# Patient Record
Sex: MALE | Race: WHITE | Employment: OTHER | URBAN - METROPOLITAN AREA
[De-identification: names, ages, dates, MRNs, and addresses within clinical notes are randomized per-mention and may not be internally consistent; named-entity substitution may affect disease eponyms.]

---

## 2017-01-19 ENCOUNTER — APPOINTMENT (OUTPATIENT)
Dept: GENERAL RADIOLOGY | Age: 82
End: 2017-01-19
Payer: MEDICARE

## 2017-01-19 ENCOUNTER — HOSPITAL ENCOUNTER (EMERGENCY)
Age: 82
Discharge: HOME | End: 2017-01-19
Payer: MEDICARE

## 2017-01-19 VITALS
HEART RATE: 117 BPM | BODY MASS INDEX: 22.46 KG/M2 | WEIGHT: 175 LBS | DIASTOLIC BLOOD PRESSURE: 84 MMHG | SYSTOLIC BLOOD PRESSURE: 131 MMHG | TEMPERATURE: 97.1 F | HEIGHT: 74 IN | RESPIRATION RATE: 20 BRPM | OXYGEN SATURATION: 97 %

## 2017-01-19 DIAGNOSIS — S80.01XA CONTUSION OF RIGHT KNEE, INITIAL ENCOUNTER: Primary | ICD-10-CM

## 2017-01-19 PROCEDURE — 99283 EMERGENCY DEPT VISIT LOW MDM: CPT

## 2017-01-19 PROCEDURE — 73562 X-RAY EXAM OF KNEE 3: CPT

## 2017-01-19 PROCEDURE — 6370000000 HC RX 637 (ALT 250 FOR IP): Performed by: PHYSICIAN ASSISTANT

## 2017-01-19 RX ORDER — HYDROCODONE BITARTRATE AND ACETAMINOPHEN 5; 325 MG/1; MG/1
1 TABLET ORAL ONCE
Status: COMPLETED | OUTPATIENT
Start: 2017-01-19 | End: 2017-01-19

## 2017-01-19 RX ORDER — HYDROCODONE BITARTRATE AND ACETAMINOPHEN 5; 325 MG/1; MG/1
1 TABLET ORAL EVERY 6 HOURS PRN
Qty: 10 TABLET | Refills: 0 | Status: SHIPPED | OUTPATIENT
Start: 2017-01-19 | End: 2017-01-26

## 2017-01-19 RX ORDER — AMOXICILLIN 400 MG/5ML
90 POWDER, FOR SUSPENSION ORAL 2 TIMES DAILY
Qty: 447 ML | Refills: 0 | Status: SHIPPED | OUTPATIENT
Start: 2017-01-19 | End: 2017-01-19 | Stop reason: CLARIF

## 2017-01-19 RX ADMIN — HYDROCODONE BITARTRATE AND ACETAMINOPHEN 1 TABLET: 5; 325 TABLET ORAL at 09:30

## 2017-01-19 ASSESSMENT — ENCOUNTER SYMPTOMS
EYE DISCHARGE: 0
ABDOMINAL PAIN: 0
CHEST TIGHTNESS: 0
ANAL BLEEDING: 0
CONSTIPATION: 0
ABDOMINAL DISTENTION: 0
CHOKING: 0
NAUSEA: 0
COLOR CHANGE: 0
COUGH: 0
STRIDOR: 0
RHINORRHEA: 0
SORE THROAT: 0
WHEEZING: 0
SHORTNESS OF BREATH: 0
VOMITING: 0

## 2017-01-19 ASSESSMENT — PAIN SCALES - GENERAL
PAINLEVEL_OUTOF10: 10
PAINLEVEL_OUTOF10: 10

## 2017-01-19 ASSESSMENT — PAIN DESCRIPTION - ORIENTATION: ORIENTATION: RIGHT

## 2017-01-19 ASSESSMENT — PAIN DESCRIPTION - LOCATION: LOCATION: KNEE

## 2021-03-21 ENCOUNTER — HOSPITAL ENCOUNTER (INPATIENT)
Age: 86
LOS: 1 days | Discharge: HOME | DRG: 565 | End: 2021-03-22
Attending: SURGERY | Admitting: SURGERY
Payer: MEDICARE

## 2021-03-21 ENCOUNTER — APPOINTMENT (OUTPATIENT)
Dept: CT IMAGING | Age: 86
DRG: 565 | End: 2021-03-21
Payer: MEDICARE

## 2021-03-21 DIAGNOSIS — I45.2 BIFASCICULAR BLOCK: ICD-10-CM

## 2021-03-21 DIAGNOSIS — S22.22XA CLOSED FRACTURE OF BODY OF STERNUM, INITIAL ENCOUNTER: Primary | ICD-10-CM

## 2021-03-21 LAB
ALBUMIN SERPL-MCNC: 4 G/DL (ref 3.5–4.6)
ALP BLD-CCNC: 98 U/L (ref 35–104)
ALT SERPL-CCNC: 29 U/L (ref 0–41)
ANION GAP SERPL CALCULATED.3IONS-SCNC: 10 MEQ/L (ref 9–15)
APTT: 27.7 SEC (ref 24.4–36.8)
AST SERPL-CCNC: 25 U/L (ref 0–40)
BASOPHILS ABSOLUTE: 0 K/UL (ref 0–0.2)
BASOPHILS RELATIVE PERCENT: 0.3 %
BILIRUB SERPL-MCNC: 0.4 MG/DL (ref 0.2–0.7)
BUN BLDV-MCNC: 21 MG/DL (ref 8–23)
CALCIUM SERPL-MCNC: 9.6 MG/DL (ref 8.5–9.9)
CHLORIDE BLD-SCNC: 102 MEQ/L (ref 95–107)
CO2: 25 MEQ/L (ref 20–31)
CREAT SERPL-MCNC: 0.7 MG/DL (ref 0.7–1.2)
EKG ATRIAL RATE: 90 BPM
EKG P AXIS: 72 DEGREES
EKG P-R INTERVAL: 208 MS
EKG Q-T INTERVAL: 406 MS
EKG QRS DURATION: 116 MS
EKG QTC CALCULATION (BAZETT): 496 MS
EKG R AXIS: -74 DEGREES
EKG T AXIS: 87 DEGREES
EKG VENTRICULAR RATE: 90 BPM
EOSINOPHILS ABSOLUTE: 0.1 K/UL (ref 0–0.7)
EOSINOPHILS RELATIVE PERCENT: 1.3 %
ETHANOL PERCENT: NORMAL G/DL
ETHANOL: <10 MG/DL (ref 0–0.08)
GFR AFRICAN AMERICAN: >60
GFR NON-AFRICAN AMERICAN: >60
GLOBULIN: 3.3 G/DL (ref 2.3–3.5)
GLUCOSE BLD-MCNC: 104 MG/DL (ref 70–99)
HCT VFR BLD CALC: 38.8 % (ref 42–52)
HEMOGLOBIN: 13.3 G/DL (ref 14–18)
INR BLD: 1.1
LYMPHOCYTES ABSOLUTE: 1.7 K/UL (ref 1–4.8)
LYMPHOCYTES RELATIVE PERCENT: 23.3 %
MCH RBC QN AUTO: 32.1 PG (ref 27–31.3)
MCHC RBC AUTO-ENTMCNC: 34.2 % (ref 33–37)
MCV RBC AUTO: 94 FL (ref 80–100)
MONOCYTES ABSOLUTE: 0.7 K/UL (ref 0.2–0.8)
MONOCYTES RELATIVE PERCENT: 10.1 %
NEUTROPHILS ABSOLUTE: 4.6 K/UL (ref 1.4–6.5)
NEUTROPHILS RELATIVE PERCENT: 65 %
PDW BLD-RTO: 13.4 % (ref 11.5–14.5)
PLATELET # BLD: 248 K/UL (ref 130–400)
POC CREATININE WHOLE BLOOD: 0.8
POTASSIUM SERPL-SCNC: 3.7 MEQ/L (ref 3.4–4.9)
PROTHROMBIN TIME: 14 SEC (ref 12.3–14.9)
RBC # BLD: 4.13 M/UL (ref 4.7–6.1)
SARS-COV-2, NAAT: NOT DETECTED
SODIUM BLD-SCNC: 137 MEQ/L (ref 135–144)
TOTAL PROTEIN: 7.3 G/DL (ref 6.3–8)
TROPONIN: <0.01 NG/ML (ref 0–0.01)
WBC # BLD: 7.1 K/UL (ref 4.8–10.8)

## 2021-03-21 PROCEDURE — 1210000000 HC MED SURG R&B

## 2021-03-21 PROCEDURE — 36415 COLL VENOUS BLD VENIPUNCTURE: CPT

## 2021-03-21 PROCEDURE — 71260 CT THORAX DX C+: CPT

## 2021-03-21 PROCEDURE — 80053 COMPREHEN METABOLIC PANEL: CPT

## 2021-03-21 PROCEDURE — 85730 THROMBOPLASTIN TIME PARTIAL: CPT

## 2021-03-21 PROCEDURE — 99223 1ST HOSP IP/OBS HIGH 75: CPT | Performed by: SURGERY

## 2021-03-21 PROCEDURE — 84484 ASSAY OF TROPONIN QUANT: CPT

## 2021-03-21 PROCEDURE — 6370000000 HC RX 637 (ALT 250 FOR IP): Performed by: SURGERY

## 2021-03-21 PROCEDURE — 2580000003 HC RX 258: Performed by: SURGERY

## 2021-03-21 PROCEDURE — 85025 COMPLETE CBC W/AUTO DIFF WBC: CPT

## 2021-03-21 PROCEDURE — 85610 PROTHROMBIN TIME: CPT

## 2021-03-21 PROCEDURE — 93005 ELECTROCARDIOGRAM TRACING: CPT | Performed by: PHYSICIAN ASSISTANT

## 2021-03-21 PROCEDURE — 6360000002 HC RX W HCPCS: Performed by: SURGERY

## 2021-03-21 PROCEDURE — 6360000004 HC RX CONTRAST MEDICATION: Performed by: PHYSICIAN ASSISTANT

## 2021-03-21 PROCEDURE — 99285 EMERGENCY DEPT VISIT HI MDM: CPT

## 2021-03-21 PROCEDURE — 82077 ASSAY SPEC XCP UR&BREATH IA: CPT

## 2021-03-21 PROCEDURE — 87635 SARS-COV-2 COVID-19 AMP PRB: CPT

## 2021-03-21 RX ORDER — SODIUM CHLORIDE 0.9 % (FLUSH) 0.9 %
10 SYRINGE (ML) INJECTION PRN
Status: DISCONTINUED | OUTPATIENT
Start: 2021-03-21 | End: 2021-03-22 | Stop reason: HOSPADM

## 2021-03-21 RX ORDER — ONDANSETRON 2 MG/ML
4 INJECTION INTRAMUSCULAR; INTRAVENOUS EVERY 6 HOURS PRN
Status: DISCONTINUED | OUTPATIENT
Start: 2021-03-21 | End: 2021-03-22 | Stop reason: HOSPADM

## 2021-03-21 RX ORDER — KETOROLAC TROMETHAMINE 15 MG/ML
15 INJECTION, SOLUTION INTRAMUSCULAR; INTRAVENOUS EVERY 6 HOURS
Status: DISCONTINUED | OUTPATIENT
Start: 2021-03-21 | End: 2021-03-22 | Stop reason: HOSPADM

## 2021-03-21 RX ORDER — SODIUM CHLORIDE 0.9 % (FLUSH) 0.9 %
10 SYRINGE (ML) INJECTION EVERY 12 HOURS SCHEDULED
Status: DISCONTINUED | OUTPATIENT
Start: 2021-03-21 | End: 2021-03-22 | Stop reason: HOSPADM

## 2021-03-21 RX ORDER — LIDOCAINE 4 G/G
2 PATCH TOPICAL DAILY
Status: DISCONTINUED | OUTPATIENT
Start: 2021-03-21 | End: 2021-03-22 | Stop reason: HOSPADM

## 2021-03-21 RX ORDER — PROMETHAZINE HYDROCHLORIDE 12.5 MG/1
12.5 TABLET ORAL EVERY 6 HOURS PRN
Status: DISCONTINUED | OUTPATIENT
Start: 2021-03-21 | End: 2021-03-22 | Stop reason: HOSPADM

## 2021-03-21 RX ORDER — POLYETHYLENE GLYCOL 3350 17 G/17G
17 POWDER, FOR SOLUTION ORAL DAILY PRN
Status: DISCONTINUED | OUTPATIENT
Start: 2021-03-21 | End: 2021-03-22 | Stop reason: HOSPADM

## 2021-03-21 RX ORDER — ACETAMINOPHEN 325 MG/1
650 TABLET ORAL EVERY 6 HOURS
Status: DISCONTINUED | OUTPATIENT
Start: 2021-03-21 | End: 2021-03-22 | Stop reason: HOSPADM

## 2021-03-21 RX ADMIN — SODIUM CHLORIDE, PRESERVATIVE FREE 10 ML: 5 INJECTION INTRAVENOUS at 20:10

## 2021-03-21 RX ADMIN — IOPAMIDOL 100 ML: 612 INJECTION, SOLUTION INTRAVENOUS at 15:24

## 2021-03-21 RX ADMIN — ACETAMINOPHEN 650 MG: 325 TABLET ORAL at 20:10

## 2021-03-21 RX ADMIN — ENOXAPARIN SODIUM 30 MG: 30 INJECTION SUBCUTANEOUS at 20:10

## 2021-03-21 ASSESSMENT — ENCOUNTER SYMPTOMS
TROUBLE SWALLOWING: 0
ABDOMINAL PAIN: 0
COLOR CHANGE: 0
EYE PAIN: 0
ALLERGIC/IMMUNOLOGIC NEGATIVE: 1
APNEA: 0
COUGH: 1
SHORTNESS OF BREATH: 0

## 2021-03-21 NOTE — ED NOTES
Pt awake and alert. No s/s of distress noted at this time. No concerns or needs at this time. Will continue to monitor.         Santa Suarez RN  03/21/21 6365
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
## 2021-03-21 NOTE — H&P
See consult note written by myself on 3/21/21 for H&P.     Luz Moffett MD  Trauma, Surgical Critical Care, and Emergency General Surgery
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
## 2021-03-21 NOTE — ACP (ADVANCE CARE PLANNING)
Directives and portable DNR orders. Length of ACP Conversation in minutes:      Conversation Outcomes:  [x] ACP discussion completed  [] Existing advance directive reviewed with patient; no changes to patient's previously recorded wishes  [] New Advance Directive completed  [] Portable Do Not Rescitate prepared for Provider review and signature  [] POLST/POST/MOLST/MOST prepared for Provider review and signature      Follow-up plan:    [] Schedule follow-up conversation to continue planning  [x] Referred individual to Provider for additional questions/concerns   [] Advised patient/agent/surrogate to review completed ACP document and update if needed with changes in condition, patient preferences or care setting    [x] This note routed to one or more involved healthcare providers.

## 2021-03-21 NOTE — ED NOTES
Report to [facility] on 2WT. Per Dr. Keven Rolon pt can be sent to the floor before Covid is resulted.      Marni Mccollum RN  03/21/21 0044

## 2021-03-22 VITALS
TEMPERATURE: 97.2 F | DIASTOLIC BLOOD PRESSURE: 70 MMHG | HEART RATE: 93 BPM | WEIGHT: 150 LBS | BODY MASS INDEX: 19.25 KG/M2 | SYSTOLIC BLOOD PRESSURE: 132 MMHG | OXYGEN SATURATION: 100 % | HEIGHT: 74 IN | RESPIRATION RATE: 18 BRPM

## 2021-03-22 PROBLEM — G89.11 ACUTE PAIN DUE TO TRAUMA: Status: ACTIVE | Noted: 2021-03-21

## 2021-03-22 LAB
ANION GAP SERPL CALCULATED.3IONS-SCNC: 7 MEQ/L (ref 9–15)
BUN BLDV-MCNC: 16 MG/DL (ref 8–23)
CALCIUM SERPL-MCNC: 8.9 MG/DL (ref 8.5–9.9)
CHLORIDE BLD-SCNC: 104 MEQ/L (ref 95–107)
CO2: 27 MEQ/L (ref 20–31)
CREAT SERPL-MCNC: 0.67 MG/DL (ref 0.7–1.2)
GFR AFRICAN AMERICAN: >60
GFR AFRICAN AMERICAN: >60
GFR NON-AFRICAN AMERICAN: >60
GFR NON-AFRICAN AMERICAN: >60
GLUCOSE BLD-MCNC: 90 MG/DL (ref 70–99)
LV EF: 60 %
LVEF MODALITY: NORMAL
PERFORMED ON: NORMAL
POC CREATININE: 0.8 MG/DL (ref 0.8–1.3)
POC SAMPLE TYPE: NORMAL
POTASSIUM REFLEX MAGNESIUM: 3.6 MEQ/L (ref 3.4–4.9)
SODIUM BLD-SCNC: 138 MEQ/L (ref 135–144)

## 2021-03-22 PROCEDURE — 93010 ELECTROCARDIOGRAM REPORT: CPT | Performed by: INTERNAL MEDICINE

## 2021-03-22 PROCEDURE — 6360000002 HC RX W HCPCS: Performed by: SURGERY

## 2021-03-22 PROCEDURE — 80048 BASIC METABOLIC PNL TOTAL CA: CPT

## 2021-03-22 PROCEDURE — 93306 TTE W/DOPPLER COMPLETE: CPT

## 2021-03-22 PROCEDURE — 99239 HOSP IP/OBS DSCHRG MGMT >30: CPT | Performed by: PHYSICIAN ASSISTANT

## 2021-03-22 PROCEDURE — 36415 COLL VENOUS BLD VENIPUNCTURE: CPT

## 2021-03-22 RX ORDER — ACETAMINOPHEN 325 MG/1
650 TABLET ORAL EVERY 6 HOURS PRN
Qty: 120 TABLET | Refills: 0 | Status: SHIPPED | OUTPATIENT
Start: 2021-03-22

## 2021-03-22 RX ADMIN — KETOROLAC TROMETHAMINE 15 MG: 15 INJECTION, SOLUTION INTRAMUSCULAR; INTRAVENOUS at 15:26

## 2021-03-22 ASSESSMENT — PAIN SCALES - GENERAL: PAINLEVEL_OUTOF10: 4
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
## 2021-03-22 NOTE — PROGRESS NOTES
2009: Admission assessment complete, see flowsheet. A/Ox4 but confused at times. Pt is impulsive and refused education from this RN to stay in the bed. An avasys is in place and needed because pt is in place and needed. Medication was given for pain per MAR. He is in bed, eyes closed, sink light left on, bed alarm on, fall band on, non slip socks applied, pt in room close to nurses station, call light in reach, needs provided. 0530: Pt took out his IV, took off his tele monitor, and removed his lidocaine patches. States, \"I didn't know they needed to stay on. \" He was educated to leave these things on in the future.

## 2021-03-23 ENCOUNTER — CARE COORDINATION (OUTPATIENT)
Dept: CASE MANAGEMENT | Age: 86
End: 2021-03-23
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
## 2021-03-24 ENCOUNTER — CARE COORDINATION (OUTPATIENT)
Dept: CASE MANAGEMENT | Age: 86
End: 2021-03-24
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
## 2021-06-17 ENCOUNTER — HOSPITAL ENCOUNTER (EMERGENCY)
Age: 86
Discharge: HOME | End: 2021-06-18
Payer: MEDICARE

## 2021-06-17 DIAGNOSIS — K59.00 CONSTIPATION, UNSPECIFIED CONSTIPATION TYPE: Primary | ICD-10-CM

## 2021-06-17 DIAGNOSIS — E86.0 DEHYDRATION: ICD-10-CM

## 2021-06-17 PROCEDURE — 96361 HYDRATE IV INFUSION ADD-ON: CPT

## 2021-06-17 PROCEDURE — 99282 EMERGENCY DEPT VISIT SF MDM: CPT

## 2021-06-17 PROCEDURE — 96360 HYDRATION IV INFUSION INIT: CPT

## 2021-06-17 RX ORDER — 0.9 % SODIUM CHLORIDE 0.9 %
1000 INTRAVENOUS SOLUTION INTRAVENOUS ONCE
Status: COMPLETED | OUTPATIENT
Start: 2021-06-17 | End: 2021-06-18

## 2021-06-17 RX ORDER — M-VIT,TX,IRON,MINS/CALC/FOLIC 27MG-0.4MG
1 TABLET ORAL DAILY
COMMUNITY

## 2021-06-18 ENCOUNTER — APPOINTMENT (OUTPATIENT)
Dept: GENERAL RADIOLOGY | Age: 86
End: 2021-06-18
Payer: MEDICARE

## 2021-06-18 ENCOUNTER — APPOINTMENT (OUTPATIENT)
Dept: CT IMAGING | Age: 86
End: 2021-06-18
Payer: MEDICARE

## 2021-06-18 VITALS
WEIGHT: 150 LBS | TEMPERATURE: 98.6 F | HEIGHT: 74 IN | BODY MASS INDEX: 19.25 KG/M2 | SYSTOLIC BLOOD PRESSURE: 139 MMHG | HEART RATE: 98 BPM | OXYGEN SATURATION: 86 % | DIASTOLIC BLOOD PRESSURE: 78 MMHG | RESPIRATION RATE: 18 BRPM

## 2021-06-18 LAB
ALBUMIN SERPL-MCNC: 4.4 G/DL (ref 3.5–4.6)
ALP BLD-CCNC: 144 U/L (ref 35–104)
ALT SERPL-CCNC: 26 U/L (ref 0–41)
ANION GAP SERPL CALCULATED.3IONS-SCNC: 13 MEQ/L (ref 9–15)
AST SERPL-CCNC: 32 U/L (ref 0–40)
BASOPHILS ABSOLUTE: 0 K/UL (ref 0–0.2)
BASOPHILS RELATIVE PERCENT: 0.3 %
BILIRUB SERPL-MCNC: 1.1 MG/DL (ref 0.2–0.7)
BILIRUBIN URINE: ABNORMAL
BLOOD, URINE: NEGATIVE
BUN BLDV-MCNC: 23 MG/DL (ref 8–23)
CALCIUM SERPL-MCNC: 10.1 MG/DL (ref 8.5–9.9)
CHLORIDE BLD-SCNC: 100 MEQ/L (ref 95–107)
CLARITY: CLEAR
CO2: 24 MEQ/L (ref 20–31)
COLOR: ABNORMAL
CREAT SERPL-MCNC: 0.8 MG/DL (ref 0.7–1.2)
EKG ATRIAL RATE: 118 BPM
EKG P AXIS: 46 DEGREES
EKG P-R INTERVAL: 178 MS
EKG Q-T INTERVAL: 330 MS
EKG QRS DURATION: 104 MS
EKG QTC CALCULATION (BAZETT): 462 MS
EKG R AXIS: -77 DEGREES
EKG T AXIS: 77 DEGREES
EKG VENTRICULAR RATE: 118 BPM
EOSINOPHILS ABSOLUTE: 0.1 K/UL (ref 0–0.7)
EOSINOPHILS RELATIVE PERCENT: 0.8 %
GFR AFRICAN AMERICAN: >60
GFR NON-AFRICAN AMERICAN: >60
GLOBULIN: 3.3 G/DL (ref 2.3–3.5)
GLUCOSE BLD-MCNC: 107 MG/DL (ref 70–99)
GLUCOSE URINE: NEGATIVE MG/DL
HCT VFR BLD CALC: 38.1 % (ref 42–52)
HEMOGLOBIN: 13.4 G/DL (ref 14–18)
KETONES, URINE: 15 MG/DL
LACTIC ACID: 1.4 MMOL/L (ref 0.5–2.2)
LACTIC ACID: 2.6 MMOL/L (ref 0.5–2.2)
LEUKOCYTE ESTERASE, URINE: NEGATIVE
LIPASE: 21 U/L (ref 12–95)
LYMPHOCYTES ABSOLUTE: 1.5 K/UL (ref 1–4.8)
LYMPHOCYTES RELATIVE PERCENT: 13.6 %
MAGNESIUM: 1.9 MG/DL (ref 1.7–2.4)
MCH RBC QN AUTO: 32.4 PG (ref 27–31.3)
MCHC RBC AUTO-ENTMCNC: 35.1 % (ref 33–37)
MCV RBC AUTO: 92.2 FL (ref 80–100)
MONOCYTES ABSOLUTE: 1.2 K/UL (ref 0.2–0.8)
MONOCYTES RELATIVE PERCENT: 11.3 %
NEUTROPHILS ABSOLUTE: 8.1 K/UL (ref 1.4–6.5)
NEUTROPHILS RELATIVE PERCENT: 74 %
NITRITE, URINE: NEGATIVE
PDW BLD-RTO: 13.5 % (ref 11.5–14.5)
PH UA: 5 (ref 5–9)
PLATELET # BLD: 301 K/UL (ref 130–400)
POTASSIUM SERPL-SCNC: 4 MEQ/L (ref 3.4–4.9)
PRO-BNP: 255 PG/ML
PROTEIN UA: NEGATIVE MG/DL
RBC # BLD: 4.14 M/UL (ref 4.7–6.1)
SODIUM BLD-SCNC: 137 MEQ/L (ref 135–144)
SPECIFIC GRAVITY UA: 1.03 (ref 1–1.03)
TOTAL CK: 79 U/L (ref 0–190)
TOTAL PROTEIN: 7.7 G/DL (ref 6.3–8)
TROPONIN: <0.01 NG/ML (ref 0–0.01)
URINE REFLEX TO CULTURE: ABNORMAL
UROBILINOGEN, URINE: 1 E.U./DL
WBC # BLD: 11 K/UL (ref 4.8–10.8)

## 2021-06-18 PROCEDURE — 82550 ASSAY OF CK (CPK): CPT

## 2021-06-18 PROCEDURE — 83735 ASSAY OF MAGNESIUM: CPT

## 2021-06-18 PROCEDURE — 93010 ELECTROCARDIOGRAM REPORT: CPT | Performed by: INTERNAL MEDICINE

## 2021-06-18 PROCEDURE — 85025 COMPLETE CBC W/AUTO DIFF WBC: CPT

## 2021-06-18 PROCEDURE — 6360000004 HC RX CONTRAST MEDICATION: Performed by: PHYSICIAN ASSISTANT

## 2021-06-18 PROCEDURE — 2580000003 HC RX 258: Performed by: PHYSICIAN ASSISTANT

## 2021-06-18 PROCEDURE — 80053 COMPREHEN METABOLIC PANEL: CPT

## 2021-06-18 PROCEDURE — 71045 X-RAY EXAM CHEST 1 VIEW: CPT

## 2021-06-18 PROCEDURE — 93005 ELECTROCARDIOGRAM TRACING: CPT | Performed by: PHYSICIAN ASSISTANT

## 2021-06-18 PROCEDURE — 84484 ASSAY OF TROPONIN QUANT: CPT

## 2021-06-18 PROCEDURE — 36415 COLL VENOUS BLD VENIPUNCTURE: CPT

## 2021-06-18 PROCEDURE — 81003 URINALYSIS AUTO W/O SCOPE: CPT

## 2021-06-18 PROCEDURE — 83690 ASSAY OF LIPASE: CPT

## 2021-06-18 PROCEDURE — 83880 ASSAY OF NATRIURETIC PEPTIDE: CPT

## 2021-06-18 PROCEDURE — 74177 CT ABD & PELVIS W/CONTRAST: CPT

## 2021-06-18 PROCEDURE — 83605 ASSAY OF LACTIC ACID: CPT

## 2021-06-18 RX ADMIN — IOPAMIDOL 100 ML: 612 INJECTION, SOLUTION INTRAVENOUS at 01:33

## 2021-06-18 RX ADMIN — SODIUM CHLORIDE 1000 ML: 9 INJECTION, SOLUTION INTRAVENOUS at 00:40

## 2021-06-18 ASSESSMENT — ENCOUNTER SYMPTOMS
TROUBLE SWALLOWING: 0
ABDOMINAL PAIN: 1
APNEA: 0
COLOR CHANGE: 0
ALLERGIC/IMMUNOLOGIC NEGATIVE: 1
EYE PAIN: 0
DIARRHEA: 1
SHORTNESS OF BREATH: 1

## 2021-06-18 NOTE — ED TRIAGE NOTES
Arrived to the ER for c/o diarrhea. States had constipation for 2 days and took a stool softener a couple of days. Today developed diarrhea and c/o rectal pain. Denies fever, chills, CP, SOB, palpitations, or abdominal pain. States urine \"looks different\" than normal but unable to describe. States having frequency over the last couple of days but denies dysuria.

## 2021-06-18 NOTE — ED PROVIDER NOTES
[address] [facility] ED  EMERGENCY DEPARTMENT ENCOUNTER      Pt Name: Aguead Renee  MRN: [id number]  [place] 1935  Date of evaluation: 6/17/2021  Provider: Mary Simpson Dr       Chief Complaint   Patient presents with    Diarrhea         HISTORY OF PRESENT ILLNESS   (Location/Symptom, Timing/Onset, Context/Setting, Quality, Duration, Modifying Factors, Severity)  Note limiting factors. Agueda Renee is a 82 y.o. male who presents to the emergency department with granddaughter who states that the patient has had diarrhea ongoing all day today. Symptoms began approximately 3 days ago with abdominal pain and constipation. Patient was given a stool softener without improvement however this morning he started to have severe, watery diarrhea. Patient does state some intermittent abdominal pain but none currently. Patient denies any vomiting. Patient states his appetite has been normal.  Granddaughter also states that patient has had exertional dyspnea that is been ongoing all day today. Even 2 or 3 steps causes the patient to be significantly short of breath. Patient also complains of some discoloration to his urine that has been ongoing for the past 2 days. But he denies any dysuria    HPI    Nursing Notes were reviewed. REVIEW OF SYSTEMS    (2-9 systems for level 4, 10 or more for level 5)     Review of Systems   Constitutional: Positive for fatigue. Negative for diaphoresis and fever. HENT: Negative for hearing loss and trouble swallowing. Eyes: Negative for pain. Respiratory: Positive for shortness of breath. Negative for apnea. Cardiovascular: Negative for chest pain. Gastrointestinal: Positive for abdominal pain and diarrhea. Endocrine: Negative. Genitourinary: Negative for hematuria. Musculoskeletal: Positive for myalgias. Negative for neck pain and neck stiffness. Skin: Negative for color change. Allergic/Immunologic: Negative.     Neurological: Negative for dizziness and numbness. Hematological: Negative. Psychiatric/Behavioral: Negative. All other systems reviewed and are negative. Except as noted above the remainder of the review of systems was reviewed and negative. PAST MEDICAL HISTORY   History reviewed. No pertinent past medical history. SURGICAL HISTORY     History reviewed. No pertinent surgical history. CURRENT MEDICATIONS       Previous Medications    ACETAMINOPHEN (TYLENOL) 325 MG TABLET    Take 2 tablets by mouth every 6 hours as needed for Pain    MULTIPLE VITAMINS-MINERALS (THERAPEUTIC MULTIVITAMIN-MINERALS) TABLET    Take 1 tablet by mouth daily       ALLERGIES     Patient has no known allergies. FAMILY HISTORY     History reviewed. No pertinent family history. SOCIAL HISTORY       Social History     Socioeconomic History    Marital status:      Spouse name: None    Number of children: None    Years of education: None    Highest education level: None   Occupational History    None   Tobacco Use    Smoking status: Current Some Day Smoker     Types: Cigarettes    Smokeless tobacco: Never Used   Vaping Use    Vaping Use: Never used   Substance and Sexual Activity    Alcohol use: None    Drug use: None    Sexual activity: None   Other Topics Concern    None   Social History Narrative    None     Social Determinants of Health     Financial Resource Strain:     Difficulty of Paying Living Expenses:    Food Insecurity:     Worried About Running Out of Food in the Last Year:     Ran Out of Food in the Last Year:    Transportation Needs:     Lack of Transportation (Medical):      Lack of Transportation (Non-Medical):    Physical Activity:     Days of Exercise per Week:     Minutes of Exercise per Session:    Stress:     Feeling of Stress :    Social Connections:     Frequency of Communication with Friends and Family:     Frequency of Social Gatherings with Friends and Family:     Attends Yazdanism Services:     Active Member of Clubs or Organizations:     Attends Club or Organization Meetings:     Marital Status:    Intimate Partner Violence:     Fear of Current or Ex-Partner:     Emotionally Abused:     Physically Abused:     Sexually Abused:        SCREENINGS                        PHYSICAL EXAM    (up to 7 for level 4, 8 or more for level 5)     ED Triage Vitals [06/17/21 2332]   BP Temp Temp Source Pulse Resp SpO2 Height Weight   116/69 98.6 °F (37 °C) Oral 110 18 91 % 6' 2\" (1.88 m) 150 lb (68 kg)       Physical Exam  Vitals and nursing note reviewed. Constitutional:       General: He is not in acute distress. Appearance: He is well-developed. He is not diaphoretic. HENT:      Head: Normocephalic and atraumatic. Mouth/Throat:      Pharynx: No oropharyngeal exudate. Eyes:      General: No scleral icterus. Conjunctiva/sclera: Conjunctivae normal.      Pupils: Pupils are equal, round, and reactive to light. Neck:      Trachea: No tracheal deviation. Cardiovascular:      Rate and Rhythm: Normal rate. Heart sounds: Normal heart sounds. Pulmonary:      Effort: Pulmonary effort is normal. No respiratory distress. Breath sounds: Normal breath sounds. Abdominal:      General: Bowel sounds are normal. There is no distension. Palpations: Abdomen is soft. Musculoskeletal:         General: Normal range of motion. Cervical back: Normal range of motion and neck supple. Skin:     General: Skin is warm and dry. Findings: No erythema or rash. Neurological:      Mental Status: He is alert and oriented to person, place, and time. Cranial Nerves: No cranial nerve deficit. Motor: No abnormal muscle tone. Psychiatric:         Behavior: Behavior normal.         Thought Content:  Thought content normal.         Judgment: Judgment normal.         DIAGNOSTIC RESULTS     EKG: All EKG's are interpreted by the Emergency Department Physician who either signs or Co-signs this chart in the absence of a cardiologist.    Sinus tachycardia, rate 118 bpm, left axis deviation, no acute ST elevation    RADIOLOGY:   Non-plain film images such as CT, Ultrasound and MRI are read by the radiologist. Plain radiographic images are visualized and preliminarily interpreted by the emergency physician with the below findings:        Interpretation per the Radiologist below, if available at the time of this note:    CT ABDOMEN PELVIS W IV CONTRAST Additional Contrast? None    (Results Pending)   XR CHEST PORTABLE    (Results Pending)         ED BEDSIDE ULTRASOUND:   Performed by ED Physician - none    LABS:  Labs Reviewed   CBC WITH AUTO DIFFERENTIAL - Abnormal; Notable for the following components:       Result Value    WBC 11.0 (*)     RBC 4.14 (*)     Hemoglobin 13.4 (*)     Hematocrit 38.1 (*)     MCH 32.4 (*)     Neutrophils Absolute 8.1 (*)     Monocytes Absolute 1.2 (*)     All other components within normal limits   LACTIC ACID, PLASMA - Abnormal; Notable for the following components:    Lactic Acid 2.6 (*)     All other components within normal limits   URINE RT REFLEX TO CULTURE - Abnormal; Notable for the following components:    Color, UA DARK YELLOW (*)     Bilirubin Urine SMALL (*)     Ketones, Urine 15 (*)     All other components within normal limits   COMPREHENSIVE METABOLIC PANEL - Abnormal; Notable for the following components:    Glucose 107 (*)     Calcium 10.1 (*)     Total Bilirubin 1.1 (*)     Alkaline Phosphatase 144 (*)     All other components within normal limits   TROPONIN   CK   LIPASE   MAGNESIUM   BRAIN NATRIURETIC PEPTIDE   LACTIC ACID, PLASMA       All other labs were within normal range or not returned as of this dictation.     EMERGENCY DEPARTMENT COURSE and DIFFERENTIAL DIAGNOSIS/MDM:   Vitals:    Vitals:    06/17/21 2332 06/18/21 0217   BP: 116/69 124/89   Pulse: 110 103   Resp: 18 20   Temp: 98.6 °F (37 °C)    TempSrc: Oral    SpO2: 91% 96%   Weight: 150 lb (68 kg)    Height: 6' 2\" (1.88 m)            MDM    Lactic acid 2.6, WBC 11. Pt was given 1L IVF. CT of the abdomen pelvis shows 6.8 cm stool in the rectum consistent with constipation. No diverticulitis. Rectal exam was performed with hard stool noted near rectum, however unable to disimpact since I can minimally reach the area. Lactic acid repeated and enema given. Lactic acid within normal limits. Enema done with relief. Standard anticipatory guidance given to patient upon discharge. Have given them a specific time frame in which to follow-up and who to follow-up with. I have also advised them that they should return to the emergency department if they get worse, or not getting better or develop any new or concerning symptoms. Patient demonstrates understanding. REASSESSMENT          CRITICAL CARE TIME   Total Critical Care time was 0 minutes, excluding separately reportable procedures. There was a high probability of clinically significant/life threatening deterioration in the patient's condition which required my urgent intervention. CONSULTS:  None    PROCEDURES:  Unless otherwise noted below, none     Procedures        FINAL IMPRESSION      1. Constipation, unspecified constipation type    2. Dehydration          DISPOSITION/PLAN   DISPOSITION        PATIENT REFERRED TO:  Michelle Lenz MD  [phone]  [phone]    In 2 days        DISCHARGE MEDICATIONS:  New Prescriptions    No medications on file     Controlled Substances Monitoring:     No flowsheet data found. (Please note that portions of this note were completed with a voice recognition program.  Efforts were made to edit the dictations but occasionally words are mis-transcribed. )    IRIS Elise (electronically signed)  Attending Emergency Physician            IRIS Ruiz  06/18/21 6519

## 2021-06-18 NOTE — ED NOTES
D/C instructions explained to patient and patients [facility] who both voiced understanding. Wheelchair to lobby and vehicle.      Max Sampson RN  06/18/21 5235

## 2021-06-19 LAB
GFR AFRICAN AMERICAN: >60
GFR NON-AFRICAN AMERICAN: >60
PERFORMED ON: NORMAL
POC CREATININE: 0.9 MG/DL (ref 0.8–1.3)
POC SAMPLE TYPE: NORMAL

## 2021-08-21 ENCOUNTER — APPOINTMENT (OUTPATIENT)
Dept: GENERAL RADIOLOGY | Age: 86
End: 2021-08-21
Payer: MEDICARE

## 2021-08-21 ENCOUNTER — APPOINTMENT (OUTPATIENT)
Dept: CT IMAGING | Age: 86
End: 2021-08-21
Payer: MEDICARE

## 2021-08-21 ENCOUNTER — HOSPITAL ENCOUNTER (EMERGENCY)
Age: 86
Discharge: HOME | End: 2021-08-21
Payer: MEDICARE

## 2021-08-21 VITALS
HEART RATE: 98 BPM | RESPIRATION RATE: 19 BRPM | BODY MASS INDEX: 15.4 KG/M2 | SYSTOLIC BLOOD PRESSURE: 101 MMHG | DIASTOLIC BLOOD PRESSURE: 65 MMHG | OXYGEN SATURATION: 97 % | HEIGHT: 74 IN | TEMPERATURE: 96.1 F | WEIGHT: 120 LBS

## 2021-08-21 DIAGNOSIS — N39.0 URINARY TRACT INFECTION WITHOUT HEMATURIA, SITE UNSPECIFIED: ICD-10-CM

## 2021-08-21 DIAGNOSIS — E86.0 DEHYDRATION: Primary | ICD-10-CM

## 2021-08-21 DIAGNOSIS — S82.025A CLOSED NONDISPLACED LONGITUDINAL FRACTURE OF LEFT PATELLA, INITIAL ENCOUNTER: ICD-10-CM

## 2021-08-21 LAB
ALBUMIN SERPL-MCNC: 3.7 G/DL (ref 3.5–4.6)
ALP BLD-CCNC: 240 U/L (ref 35–104)
ALT SERPL-CCNC: 97 U/L (ref 0–41)
ANION GAP SERPL CALCULATED.3IONS-SCNC: 11 MEQ/L (ref 9–15)
AST SERPL-CCNC: 99 U/L (ref 0–40)
BACTERIA: NEGATIVE /HPF
BASOPHILS ABSOLUTE: 0 K/UL (ref 0–0.2)
BASOPHILS RELATIVE PERCENT: 0.3 %
BILIRUB SERPL-MCNC: 2 MG/DL (ref 0.2–0.7)
BILIRUBIN URINE: ABNORMAL
BLOOD, URINE: NEGATIVE
BUN BLDV-MCNC: 23 MG/DL (ref 8–23)
CALCIUM SERPL-MCNC: 9.9 MG/DL (ref 8.5–9.9)
CHLORIDE BLD-SCNC: 98 MEQ/L (ref 95–107)
CLARITY: ABNORMAL
CO2: 24 MEQ/L (ref 20–31)
COLOR: ABNORMAL
CREAT SERPL-MCNC: 0.79 MG/DL (ref 0.7–1.2)
EOSINOPHILS ABSOLUTE: 0 K/UL (ref 0–0.7)
EOSINOPHILS RELATIVE PERCENT: 0.2 %
EPITHELIAL CELLS, UA: ABNORMAL /HPF (ref 0–5)
GFR AFRICAN AMERICAN: >60
GFR NON-AFRICAN AMERICAN: >60
GLOBULIN: 3.4 G/DL (ref 2.3–3.5)
GLUCOSE BLD-MCNC: 93 MG/DL (ref 70–99)
GLUCOSE URINE: NEGATIVE MG/DL
HCT VFR BLD CALC: 36.9 % (ref 42–52)
HEMOGLOBIN: 12.9 G/DL (ref 14–18)
KETONES, URINE: ABNORMAL MG/DL
LEUKOCYTE ESTERASE, URINE: ABNORMAL
LIPASE: 9 U/L (ref 12–95)
LYMPHOCYTES ABSOLUTE: 0.6 K/UL (ref 1–4.8)
LYMPHOCYTES RELATIVE PERCENT: 6.8 %
MCH RBC QN AUTO: 33.6 PG (ref 27–31.3)
MCHC RBC AUTO-ENTMCNC: 35.1 % (ref 33–37)
MCV RBC AUTO: 95.7 FL (ref 80–100)
MONOCYTES ABSOLUTE: 0.5 K/UL (ref 0.2–0.8)
MONOCYTES RELATIVE PERCENT: 5.8 %
NEUTROPHILS ABSOLUTE: 7.7 K/UL (ref 1.4–6.5)
NEUTROPHILS RELATIVE PERCENT: 86.9 %
NITRITE, URINE: POSITIVE
PDW BLD-RTO: 14.7 % (ref 11.5–14.5)
PH UA: 5 (ref 5–9)
PLATELET # BLD: 177 K/UL (ref 130–400)
POTASSIUM SERPL-SCNC: 3.7 MEQ/L (ref 3.4–4.9)
PROTEIN UA: 30 MG/DL
RBC # BLD: 3.85 M/UL (ref 4.7–6.1)
SODIUM BLD-SCNC: 133 MEQ/L (ref 135–144)
SPECIFIC GRAVITY UA: 1.03 (ref 1–1.03)
TOTAL PROTEIN: 7.1 G/DL (ref 6.3–8)
URINE REFLEX TO CULTURE: ABNORMAL
UROBILINOGEN, URINE: 1 E.U./DL
WBC # BLD: 8.9 K/UL (ref 4.8–10.8)
WBC UA: ABNORMAL /HPF (ref 0–5)

## 2021-08-21 PROCEDURE — 99285 EMERGENCY DEPT VISIT HI MDM: CPT

## 2021-08-21 PROCEDURE — 74018 RADEX ABDOMEN 1 VIEW: CPT

## 2021-08-21 PROCEDURE — 73700 CT LOWER EXTREMITY W/O DYE: CPT

## 2021-08-21 PROCEDURE — 80053 COMPREHEN METABOLIC PANEL: CPT

## 2021-08-21 PROCEDURE — 96360 HYDRATION IV INFUSION INIT: CPT

## 2021-08-21 PROCEDURE — 81001 URINALYSIS AUTO W/SCOPE: CPT

## 2021-08-21 PROCEDURE — 6830039000 HC L3 TRAUMA ALERT

## 2021-08-21 PROCEDURE — 73564 X-RAY EXAM KNEE 4 OR MORE: CPT

## 2021-08-21 PROCEDURE — 36415 COLL VENOUS BLD VENIPUNCTURE: CPT

## 2021-08-21 PROCEDURE — 83690 ASSAY OF LIPASE: CPT

## 2021-08-21 PROCEDURE — 93005 ELECTROCARDIOGRAM TRACING: CPT | Performed by: PHYSICIAN ASSISTANT

## 2021-08-21 PROCEDURE — 96361 HYDRATE IV INFUSION ADD-ON: CPT

## 2021-08-21 PROCEDURE — 85025 COMPLETE CBC W/AUTO DIFF WBC: CPT

## 2021-08-21 PROCEDURE — 2580000003 HC RX 258: Performed by: PHYSICIAN ASSISTANT

## 2021-08-21 RX ORDER — HYDROCODONE BITARTRATE AND ACETAMINOPHEN 5; 325 MG/1; MG/1
1 TABLET ORAL EVERY 6 HOURS PRN
Qty: 12 TABLET | Refills: 0 | Status: SHIPPED | OUTPATIENT
Start: 2021-08-21 | End: 2021-08-24

## 2021-08-21 RX ORDER — POTASSIUM CHLORIDE 20 MEQ/1
40 TABLET, EXTENDED RELEASE ORAL ONCE
Status: DISCONTINUED | OUTPATIENT
Start: 2021-08-21 | End: 2021-08-21

## 2021-08-21 RX ORDER — 0.9 % SODIUM CHLORIDE 0.9 %
1000 INTRAVENOUS SOLUTION INTRAVENOUS ONCE
Status: COMPLETED | OUTPATIENT
Start: 2021-08-21 | End: 2021-08-21

## 2021-08-21 RX ORDER — CIPROFLOXACIN 500 MG/1
500 TABLET, FILM COATED ORAL 2 TIMES DAILY
Qty: 14 TABLET | Refills: 0 | Status: SHIPPED | OUTPATIENT
Start: 2021-08-21 | End: 2021-08-28

## 2021-08-21 RX ADMIN — SODIUM CHLORIDE 1000 ML: 9 INJECTION, SOLUTION INTRAVENOUS at 12:57

## 2021-08-21 ASSESSMENT — ENCOUNTER SYMPTOMS
ABDOMINAL PAIN: 0
EYE DISCHARGE: 0
CONSTIPATION: 0
COLOR CHANGE: 0
ABDOMINAL DISTENTION: 0
RHINORRHEA: 0
SORE THROAT: 0
SHORTNESS OF BREATH: 0

## 2021-08-21 ASSESSMENT — PAIN SCALES - GENERAL: PAINLEVEL_OUTOF10: 0

## 2021-08-21 ASSESSMENT — PAIN DESCRIPTION - ORIENTATION: ORIENTATION: LEFT

## 2021-08-21 ASSESSMENT — PAIN DESCRIPTION - LOCATION: LOCATION: KNEE

## 2021-08-21 NOTE — ED TRIAGE NOTES
Pt to ER with c/o fall x 2 yesterday and diarrhea, pt states he fell to his knees 2x yesterday and today has been having left knee pain, pt also states he has diarrhea, pt states he did not hit his head, pt is not on blood thinners

## 2021-08-21 NOTE — ED PROVIDER NOTES
[address] [facility] ED  eMERGENCY dEPARTMENT eNCOUnter      Pt Name: Mary Sutherland  MRN: [id number]  [place] 1935  Date of evaluation: 8/21/2021  Provider: Amparo Ferro PA-C    CHIEF COMPLAINT       Chief Complaint   Patient presents with    Fall     to his knees 2x yesterday    Diarrhea         HISTORY OF PRESENT ILLNESS   (Location/Symptom, Timing/Onset,Context/Setting, Quality, Duration, Modifying Factors, Severity)  Note limiting factors. Mary Sutherland is a 82 y.o. male who presents to the emergency department with a complaint of dizziness, and fall, which granddaughter states occurred yesterday. She states that patient is been using Ex-Lax over the last 1 week for constipation, she states he has had ongoing diarrhea for the last 48 hours. He has had multiple episodes of watery diarrhea. Yesterday he felt weak, dizzy, and fell to his knees. Injuring left knee, patient has been able to stand and ambulate after the incident but with increased pain. He rates current pain at this time 0 out of 10. No past medical history according to family and per chart review. HPI    NursingNotes were reviewed. REVIEW OF SYSTEMS    (2-9 systems for level 4, 10 or more for level 5)     Review of Systems   Constitutional: Negative for activity change and appetite change. HENT: Negative for congestion, ear discharge, ear pain, nosebleeds, rhinorrhea and sore throat. Eyes: Negative for discharge. Respiratory: Negative for shortness of breath. Cardiovascular: Negative for chest pain, palpitations and leg swelling. Gastrointestinal: Negative for abdominal distention, abdominal pain and constipation. Genitourinary: Negative for difficulty urinating and dysuria. Musculoskeletal: Negative for arthralgias. Left knee pain   Skin: Negative for color change. Neurological: Positive for dizziness. Negative for syncope, numbness and headaches.    Psychiatric/Behavioral: Negative for agitation and confusion. Except as noted above the remainder of the review of systems was reviewed and negative. PAST MEDICAL HISTORY   History reviewed. No pertinent past medical history. SURGICALHISTORY     History reviewed. No pertinent surgical history. CURRENT MEDICATIONS       Discharge Medication List as of 8/21/2021  2:53 PM      CONTINUE these medications which have NOT CHANGED    Details   Multiple Vitamins-Minerals (THERAPEUTIC MULTIVITAMIN-MINERALS) tablet Take 1 tablet by mouth dailyHistorical Med      acetaminophen (TYLENOL) 325 MG tablet Take 2 tablets by mouth every 6 hours as needed for Pain, Disp-120 tablet, R-0Print             ALLERGIES     Patient has no known allergies. FAMILY HISTORY     History reviewed. No pertinent family history. SOCIAL HISTORY       Social History     Socioeconomic History    Marital status:      Spouse name: None    Number of children: None    Years of education: None    Highest education level: None   Occupational History    None   Tobacco Use    Smoking status: Current Some Day Smoker     Types: Cigarettes    Smokeless tobacco: Never Used   Vaping Use    Vaping Use: Never used   Substance and Sexual Activity    Alcohol use: None    Drug use: None    Sexual activity: None   Other Topics Concern    None   Social History Narrative    None     Social Determinants of Health     Financial Resource Strain:     Difficulty of Paying Living Expenses:    Food Insecurity:     Worried About Running Out of Food in the Last Year:     Ran Out of Food in the Last Year:    Transportation Needs:     Lack of Transportation (Medical):      Lack of Transportation (Non-Medical):    Physical Activity:     Days of Exercise per Week:     Minutes of Exercise per Session:    Stress:     Feeling of Stress :    Social Connections:     Frequency of Communication with Friends and Family:     Frequency of Social Gatherings with Friends and Abdomen is flat. Bowel sounds are normal. There is no distension or abdominal bruit. Palpations: There is no shifting dullness, fluid wave, hepatomegaly, splenomegaly, mass or pulsatile mass. Tenderness: There is no abdominal tenderness. There is no right CVA tenderness, left CVA tenderness, guarding or rebound. Negative signs include Du's sign, Rovsing's sign and McBurney's sign. Musculoskeletal:         General: No deformity. Cervical back: Normal range of motion and neck supple. No rigidity. Legs:       Comments: Left knee no obvious injuries, no edema hematomas, no ecchymosis, no ligament laxity, increased pain to palpation patella. No pelvic or hip pain, no shortening or rotation. Lower extremities are neurovascular intact. Skin:     General: Skin is warm and dry. Capillary Refill: Capillary refill takes less than 2 seconds. Coloration: Skin is not jaundiced. Neurological:      General: No focal deficit present. Mental Status: He is alert and oriented to person, place, and time. Mental status is at baseline. Cranial Nerves: No cranial nerve deficit. Sensory: No sensory deficit. Motor: No weakness. Coordination: Coordination normal.   Psychiatric:         Mood and Affect: Mood normal.         DIAGNOSTIC RESULTS     EKG: All EKG's are interpreted by the Emergency Department Physician who either signs or Co-signsthis chart in the absence of a cardiologist.    EKG shows sinus rhythm with a first-degree AV block left bundle branch block 80 bpm T wave inversions aVL V1 no ventricular ectopy  ms    RADIOLOGY:   Non-plain filmimages such as CT, Ultrasound and MRI are read by the radiologist. Plain radiographic images are visualized and preliminarily interpreted by the emergency physician with the below findings:        Interpretation per the Radiologist below, if available at the time ofthis note:    CT KNEE LEFT WO CONTRAST   Final Result   1. FRACTURE OF THE INFEROLATERAL ASPECT OF THE PATELLA. .   2. TRICOMPARTMENTAL DEGENERATIVE CHANGES. All CT scans at this facility use dose modulation, iterative reconstruction, and/or weight based dosing when appropriate to reduce radiation dose to as low as reasonably achievable. XR ABDOMEN (KUB) (SINGLE AP VIEW)   Final Result   NONSPECIFIC BOWEL GAS PATTERN      XR ABDOMEN (KUB) (SINGLE AP VIEW), XR KNEE LEFT (MIN 4 VIEWS)       CLINICAL HISTORY:  diarrhea        COMPARISON: None      FINDINGS:      Four views of the left knee are submitted. There is diffuse generalized osteopenia. No acute fractures. No dislocations. Small effusion noted. .   The patella appears to be high riding. Correlate clinically. IMPRESSION: NO ACUTE FRACTURES   HIGH RIDING PATELLA. THIS COULD BE A PATELLA RILEY BUT PATELLA RUPTURE IS NOT EXCLUDED. CORRELATE CLINICALLY      XR KNEE LEFT (MIN 4 VIEWS)   Final Result   NONSPECIFIC BOWEL GAS PATTERN      XR ABDOMEN (KUB) (SINGLE AP VIEW), XR KNEE LEFT (MIN 4 VIEWS)       CLINICAL HISTORY:  diarrhea        COMPARISON: None      FINDINGS:      Four views of the left knee are submitted. There is diffuse generalized osteopenia. No acute fractures. No dislocations. Small effusion noted. .   The patella appears to be high riding. Correlate clinically. IMPRESSION: NO ACUTE FRACTURES   HIGH RIDING PATELLA. THIS COULD BE A PATELLA RILEY BUT PATELLA RUPTURE IS NOT EXCLUDED.  CORRELATE CLINICALLY            ED BEDSIDE ULTRASOUND:   Performed by ED Physician - none    LABS:  Labs Reviewed   COMPREHENSIVE METABOLIC PANEL - Abnormal; Notable for the following components:       Result Value    Sodium 133 (*)     Total Bilirubin 2.0 (*)     Alkaline Phosphatase 240 (*)     ALT 97 (*)     AST 99 (*)     All other components within normal limits   CBC WITH AUTO DIFFERENTIAL - Abnormal; Notable for the following components:    RBC 3.85 (*)     Hemoglobin 12.9 (*)     Hematocrit 36.9 (*)     MCH 33.6 (*)     RDW 14.7 (*)     Neutrophils Absolute 7.7 (*)     Lymphocytes Absolute 0.6 (*)     All other components within normal limits   LIPASE - Abnormal; Notable for the following components:    Lipase 9 (*)     All other components within normal limits   URINE RT REFLEX TO CULTURE - Abnormal; Notable for the following components:    Color, UA DARK YELLOW (*)     Clarity, UA CLOUDY (*)     Bilirubin Urine MODERATE (*)     Ketones, Urine TRACE (*)     Protein, UA 30 (*)     Nitrite, Urine POSITIVE (*)     Leukocyte Esterase, Urine TRACE (*)     All other components within normal limits   MICROSCOPIC URINALYSIS - Abnormal; Notable for the following components:    WBC, UA 6-9 (*)     All other components within normal limits       All other labs were within normal range or not returned as of this dictation. EMERGENCY DEPARTMENT COURSE and DIFFERENTIAL DIAGNOSIS/MDM:   Vitals:    Vitals:    08/21/21 1207 08/21/21 1218 08/21/21 1258   BP: 101/65     Pulse: 98     Resp: 18  19   Temp: 96.1 °F (35.6 °C)     TempSrc: Temporal     SpO2: 95%  97%   Weight: 120 lb (54.4 kg) 120 lb (54.4 kg)    Height: 6' 2\" (1.88 m) 6' 2\" (1.88 m)             MDM  Number of Diagnoses or Management Options  Closed nondisplaced longitudinal fracture of left patella, initial encounter  Dehydration  Urinary tract infection without hematuria, site unspecified  Diagnosis management comments: Patient present ED with complaint of dizziness, and fall, which patient states occurred yesterday. He states that he has had some ongoing constipation, and for the last 1 week he has been using Ex-Lax granddaughter states that he has had frequent episodes of loose stool, she is concerned for dehydration.   On arrival patient does look clinically dehydrated, dry oral mucosa, he is orthostatic positive, he was given a liter of normal saline, is feeling much better at this time. X-rays were also obtained of the left knee, which shows concerns for fracture of the lateral aspect of the patella, KUB was also completed for concerns of constipation, which shows no acute process. Patient attempted to ambulate, he has increased pain with ambulation, CT scan of the knee was done and shows fracture of the inferior lateral aspect of the patella. Patient was placed in a knee immobilizer, he was given pain medications for home, and orthopedic referral.  He was advised if he has any worsening or change symptoms, return to the ER for reevaluation. CRITICAL CARE TIME   Total Critical Care time was  minutes, excluding separately reportableprocedures. There was a high probability of clinicallysignificant/life threatening deterioration in the patient's condition which required my urgent intervention. CONSULTS:  None    PROCEDURES:  Unless otherwise noted below, none     Procedures    FINAL IMPRESSION      1. Dehydration    2. Closed nondisplaced longitudinal fracture of left patella, initial encounter    3. Urinary tract infection without hematuria, site unspecified          DISPOSITION/PLAN   DISPOSITION Decision To Discharge 08/21/2021 03:20:06 PM      PATIENT REFERRED TO:  Hannah Salinas MD  [phone]    In 3 days      [facility], [place] [id number] [address] [facility]  In 2 days        DISCHARGE MEDICATIONS:  Discharge Medication List as of 8/21/2021  2:53 PM      START taking these medications    Details   ciprofloxacin (CIPRO) 500 MG tablet Take 1 tablet by mouth 2 times daily for 7 days, Disp-14 tablet, R-0Print      HYDROcodone-acetaminophen (NORCO) 5-325 MG per tablet Take 1 tablet by mouth every 6 hours as needed for Pain for up to 3 days. Intended supply: 3 days.  Take lowest dose possible

## 2021-08-23 LAB
EKG ATRIAL RATE: 80 BPM
EKG P AXIS: 74 DEGREES
EKG P-R INTERVAL: 212 MS
EKG Q-T INTERVAL: 408 MS
EKG QRS DURATION: 122 MS
EKG QTC CALCULATION (BAZETT): 470 MS
EKG R AXIS: -81 DEGREES
EKG T AXIS: 75 DEGREES
EKG VENTRICULAR RATE: 80 BPM

## 2021-08-23 PROCEDURE — 93010 ELECTROCARDIOGRAM REPORT: CPT | Performed by: INTERNAL MEDICINE

## 2021-08-30 ENCOUNTER — OFFICE VISIT (OUTPATIENT)
Dept: ORTHOPEDIC SURGERY | Age: 86
End: 2021-08-30
Payer: MEDICARE

## 2021-08-30 VITALS
HEIGHT: 74 IN | WEIGHT: 120 LBS | TEMPERATURE: 97.2 F | BODY MASS INDEX: 15.4 KG/M2 | HEART RATE: 78 BPM | OXYGEN SATURATION: 98 %

## 2021-08-30 DIAGNOSIS — T14.8XXA FRACTURE: Primary | ICD-10-CM

## 2021-08-30 DIAGNOSIS — S82.025A CLOSED NONDISPLACED LONGITUDINAL FRACTURE OF LEFT PATELLA, INITIAL ENCOUNTER: ICD-10-CM

## 2021-08-30 PROCEDURE — 1123F ACP DISCUSS/DSCN MKR DOCD: CPT | Performed by: ORTHOPAEDIC SURGERY

## 2021-08-30 PROCEDURE — 4004F PT TOBACCO SCREEN RCVD TLK: CPT | Performed by: ORTHOPAEDIC SURGERY

## 2021-08-30 PROCEDURE — 4040F PNEUMOC VAC/ADMIN/RCVD: CPT | Performed by: ORTHOPAEDIC SURGERY

## 2021-08-30 PROCEDURE — G8419 CALC BMI OUT NRM PARAM NOF/U: HCPCS | Performed by: ORTHOPAEDIC SURGERY

## 2021-08-30 PROCEDURE — 99203 OFFICE O/P NEW LOW 30 MIN: CPT | Performed by: ORTHOPAEDIC SURGERY

## 2021-08-30 PROCEDURE — 27520 TREAT KNEECAP FRACTURE: CPT | Performed by: ORTHOPAEDIC SURGERY

## 2021-08-30 PROCEDURE — G8427 DOCREV CUR MEDS BY ELIG CLIN: HCPCS | Performed by: ORTHOPAEDIC SURGERY

## 2021-08-30 NOTE — PROGRESS NOTES
Subjective:      Patient ID: Dionte Johnson is a 82 y.o. male who presents today for:  Chief Complaint   Patient presents with   [facility] Follow-up     closed nondisplaced longitudinal fracture of left patella. He took his brace off due to not being able to walk in it. HPI  Patient fell into the left knee and happened but a week and half ago    No past medical history on file. No past surgical history on file. Social History     Socioeconomic History    Marital status:      Spouse name: Not on file    Number of children: Not on file    Years of education: Not on file    Highest education level: Not on file   Occupational History    Not on file   Tobacco Use    Smoking status: Current Some Day Smoker     Types: Cigarettes    Smokeless tobacco: Never Used   Vaping Use    Vaping Use: Never used   Substance and Sexual Activity    Alcohol use: Not on file    Drug use: Not on file    Sexual activity: Not on file   Other Topics Concern    Not on file   Social History Narrative    Not on file     Social Determinants of Health     Financial Resource Strain:     Difficulty of Paying Living Expenses:    Food Insecurity:     Worried About [email or web address] in the Last Year:     [address] [employer] in the Last Year:    Transportation Needs:     Lack of Transportation (Medical):  Lack of Transportation (Non-Medical):    Physical Activity:     Days of Exercise per Week:     Minutes of Exercise per Session:    Stress:     Feeling of Stress :    Social Connections:     Frequency of Communication with Friends and Family:     Frequency of Social Gatherings with Friends and Family:     Attends [employer] Services:     Active Member of Clubs or Organizations:     Attends Club or Organization Meetings:     Marital Status:    Intimate Partner Violence:     Fear of Current or Ex-Partner:     Emotionally Abused:     Physically Abused:     Sexually Abused:      No family history on file.   No Known Allergies  Current Outpatient Medications on File Prior to Visit   Medication Sig Dispense Refill    Multiple Vitamins-Minerals (THERAPEUTIC MULTIVITAMIN-MINERALS) tablet Take 1 tablet by mouth daily      acetaminophen (TYLENOL) 325 MG tablet Take 2 tablets by mouth every 6 hours as needed for Pain 120 tablet 0     No current facility-administered medications on file prior to visit. Review of Systems  Left knee discomfort  No left leg weakness outside of baseline    Objective:   Pulse 78   Temp 97.2 °F (36.2 °C) (Temporal)   Ht 6' 2\" (1.88 m)   Wt 120 lb (54.4 kg)   SpO2 98%   BMI 15.41 kg/m²     Ortho Exam  He does have a small to moderate sized effusion  No bruising no wounds  Extensor mechanism is intact  There is patellofemoral crepitus    Radiographs and Laboratory Studies:     Diagnostic Imaging Studies:    X-rays demonstrate a nondisplaced lateral facet fracture      Assessment:       Diagnosis Orders   1. Fracture     2. Closed nondisplaced longitudinal fracture of left patella, initial encounter  [facility] CLOSED [facility]         Plan:   Nonoperative treatment  Symptom control measures  He can be weightbearing as tolerated does not require any braces or immobilization  Follow-up in 6 weeks if still having any significant symptoms    Orders Placed This Encounter   Procedures    [facility] CLOSED [facility]     No orders of the defined types were placed in this encounter. Return in about 6 weeks (around 10/11/2021), or if symptoms worsen or fail to improve.       Yemi Ugalde DO